# Patient Record
(demographics unavailable — no encounter records)

---

## 2024-10-10 NOTE — WORK
[Mild Partial] : mild partial [Can return to work without limitations on ______] : can return to work without limitations on [unfilled] [I provided the services listed above] :  I provided the services listed above. [FreeTextEntry1] : Fair [FreeTextEntry3] : lg

## 2024-10-10 NOTE — ASSESSMENT
[FreeTextEntry1] :  The patient is being seen today for left wrist injury.  He slipped and fell leaving a fire and landed on his wrist. he did have shooting pains when grabbing things that has resolved.  He says that he is doing a lot better than prior.  He is back to normal activities.  This occurred at work.  He says he is doing significantly better than prior.  He had an MRI done.  He states that the doctor there was going to send him back full duty however wanted to make sure nothing else was going on and would like clearance.  He had an MRI done.  left wrist: No gross deformities, no tenderness to palpation, full ROM of wrist and fingers, neurovascular intact   x-ray left wrist 3 views negative [facility] [place] shows uptake at the SL which could indicate a sprain, sprain of the volar extrinsic ligaments with a volar ganglion, undersurface TFCC fraying without evidence of tear, mild tenosynovitis, small effusion  The patient had an injury to the wrist.  He had a sprain from his fall.  He is doing significantly better and does not have pain on exam.  There is no concern of the wrist at this time.  He may resume work full duty.  Should there be any issues he will let us know.